# Patient Record
Sex: FEMALE | Race: BLACK OR AFRICAN AMERICAN | NOT HISPANIC OR LATINO
[De-identification: names, ages, dates, MRNs, and addresses within clinical notes are randomized per-mention and may not be internally consistent; named-entity substitution may affect disease eponyms.]

---

## 2020-04-26 ENCOUNTER — MESSAGE (OUTPATIENT)
Age: 50
End: 2020-04-26

## 2020-05-08 LAB
SARS-COV-2 IGG SERPL IA-ACNC: 0 INDEX
SARS-COV-2 IGG SERPL QL IA: NEGATIVE

## 2021-08-24 ENCOUNTER — OFFICE VISIT (OUTPATIENT)
Dept: DENTISTRY | Facility: CLINIC | Age: 51
End: 2021-08-24

## 2021-08-24 VITALS — DIASTOLIC BLOOD PRESSURE: 96 MMHG | SYSTOLIC BLOOD PRESSURE: 166 MMHG

## 2021-08-24 DIAGNOSIS — K04.7 DENTAL ABSCESS: Primary | ICD-10-CM

## 2021-08-24 PROCEDURE — D7140 EXTRACTION, ERUPTED TOOTH OR EXPOSED ROOT (ELEVATION AND/OR FORCEPS REMOVAL): HCPCS | Performed by: DENTIST

## 2021-08-24 RX ORDER — IBUPROFEN 400 MG/1
400 TABLET ORAL EVERY 6 HOURS PRN
Qty: 20 TABLET | Refills: 0 | Status: SHIPPED | OUTPATIENT
Start: 2021-08-24

## 2021-08-24 NOTE — PROGRESS NOTES
Oral Surgery    Javon Canada presents for Ext #31  Pt's BP high at this visit (166/96) but pt in severe pain  Dr Marcelino Weber still recommended extracting tooth to get the pt out of pain  [address], patient denies any changes  Obtained a direct and personal consent  Risks and complications were explained  Pt agreed and consented  Consent scanned in doc center  Administered 2 cc of 2 % Lidocaine w/ 1:100,000 epi via block/infiltration  ? Adequate anesthesia obtained, reflected gingiva, elevated, and extracted #31  Socket irrigated, and gauze with pressure applied      Upon dismissal, patient received POI, gauze, and RX: ibuprofen 400mg    Dr Singh Weber      NV: comp exam

## 2023-10-20 ENCOUNTER — APPOINTMENT (EMERGENCY)
Dept: RADIOLOGY | Facility: HOSPITAL | Age: 53
End: 2023-10-20

## 2023-10-20 ENCOUNTER — HOSPITAL ENCOUNTER (EMERGENCY)
Facility: HOSPITAL | Age: 53
Discharge: HOME | End: 2023-10-20
Attending: EMERGENCY MEDICINE

## 2023-10-20 VITALS
TEMPERATURE: 98.3 F | SYSTOLIC BLOOD PRESSURE: 176 MMHG | RESPIRATION RATE: 22 BRPM | HEART RATE: 88 BPM | DIASTOLIC BLOOD PRESSURE: 98 MMHG | OXYGEN SATURATION: 97 %

## 2023-10-20 DIAGNOSIS — R06.00 DYSPNEA: ICD-10-CM

## 2023-10-20 DIAGNOSIS — R09.81 CONGESTION OF NASAL SINUS: Primary | ICD-10-CM

## 2023-10-20 LAB
ANION GAP SERPL CALCULATED.3IONS-SCNC: 7 MMOL/L
BASOPHILS # BLD AUTO: 0.06 THOUSANDS/ÂΜL (ref 0–0.1)
BASOPHILS NFR BLD AUTO: 1 % (ref 0–1)
BUN SERPL-MCNC: 19 MG/DL (ref 5–25)
CALCIUM SERPL-MCNC: 8.9 MG/DL (ref 8.4–10.2)
CARDIAC TROPONIN I PNL SERPL HS: 4 NG/L
CHLORIDE SERPL-SCNC: 104 MMOL/L (ref 96–108)
CO2 SERPL-SCNC: 30 MMOL/L (ref 21–32)
CREAT SERPL-MCNC: 1.16 MG/DL (ref 0.6–1.3)
EOSINOPHIL # BLD AUTO: 0.02 THOUSAND/ÂΜL (ref 0–0.61)
EOSINOPHIL NFR BLD AUTO: 0 % (ref 0–6)
ERYTHROCYTE [DISTWIDTH] IN BLOOD BY AUTOMATED COUNT: 14.5 % (ref 11.6–15.1)
GFR SERPL CREATININE-BSD FRML MDRD: 54 ML/MIN/1.73SQ M
GLUCOSE SERPL-MCNC: 132 MG/DL (ref 65–140)
HCT VFR BLD AUTO: 41.5 % (ref 34.8–46.1)
HGB BLD-MCNC: 13.4 G/DL (ref 11.5–15.4)
IMM GRANULOCYTES # BLD AUTO: 0.06 THOUSAND/UL (ref 0–0.2)
IMM GRANULOCYTES NFR BLD AUTO: 1 % (ref 0–2)
LYMPHOCYTES # BLD AUTO: 1.27 THOUSANDS/ÂΜL (ref 0.6–4.47)
LYMPHOCYTES NFR BLD AUTO: 12 % (ref 14–44)
MCH RBC QN AUTO: 29.2 PG (ref 26.8–34.3)
MCHC RBC AUTO-ENTMCNC: 32.3 G/DL (ref 31.4–37.4)
MCV RBC AUTO: 90 FL (ref 82–98)
MONOCYTES # BLD AUTO: 0.19 THOUSAND/ÂΜL (ref 0.17–1.22)
MONOCYTES NFR BLD AUTO: 2 % (ref 4–12)
NEUTROPHILS # BLD AUTO: 9.31 THOUSANDS/ÂΜL (ref 1.85–7.62)
NEUTS SEG NFR BLD AUTO: 84 % (ref 43–75)
NRBC BLD AUTO-RTO: 0 /100 WBCS
PLATELET # BLD AUTO: 310 THOUSANDS/UL (ref 149–390)
PMV BLD AUTO: 9.9 FL (ref 8.9–12.7)
POTASSIUM SERPL-SCNC: 3.7 MMOL/L (ref 3.5–5.3)
RBC # BLD AUTO: 4.59 MILLION/UL (ref 3.81–5.12)
SODIUM SERPL-SCNC: 141 MMOL/L (ref 135–147)
WBC # BLD AUTO: 10.91 THOUSAND/UL (ref 4.31–10.16)

## 2023-10-20 PROCEDURE — 99285 EMERGENCY DEPT VISIT HI MDM: CPT | Performed by: EMERGENCY MEDICINE

## 2023-10-20 PROCEDURE — 99285 EMERGENCY DEPT VISIT HI MDM: CPT

## 2023-10-20 PROCEDURE — 71046 X-RAY EXAM CHEST 2 VIEWS: CPT

## 2023-10-20 PROCEDURE — 36415 COLL VENOUS BLD VENIPUNCTURE: CPT

## 2023-10-20 PROCEDURE — 80048 BASIC METABOLIC PNL TOTAL CA: CPT

## 2023-10-20 PROCEDURE — 84484 ASSAY OF TROPONIN QUANT: CPT

## 2023-10-20 PROCEDURE — 93005 ELECTROCARDIOGRAM TRACING: CPT

## 2023-10-20 PROCEDURE — 85025 COMPLETE CBC W/AUTO DIFF WBC: CPT

## 2023-10-20 RX ORDER — ALBUTEROL SULFATE 2.5 MG/3ML
1 SOLUTION RESPIRATORY (INHALATION) ONCE
Status: COMPLETED | OUTPATIENT
Start: 2023-10-20 | End: 2023-10-20

## 2023-10-20 RX ORDER — METHYLPREDNISOLONE SOD SUCC 125 MG
1 VIAL (EA) INJECTION ONCE
Status: COMPLETED | OUTPATIENT
Start: 2023-10-20 | End: 2023-10-20

## 2023-10-20 RX ORDER — IPRATROPIUM BROMIDE AND ALBUTEROL SULFATE .5; 3 MG/3ML; MG/3ML
1 SOLUTION RESPIRATORY (INHALATION) ONCE
Status: COMPLETED | OUTPATIENT
Start: 2023-10-20 | End: 2023-10-20

## 2023-10-20 RX ORDER — OXYMETAZOLINE HYDROCHLORIDE 0.05 G/100ML
2 SPRAY NASAL 2 TIMES DAILY
Qty: 30 ML | Refills: 0 | Status: SHIPPED | OUTPATIENT
Start: 2023-10-20

## 2023-10-20 NOTE — ED ATTENDING ATTESTATION
10/20/2023  ISherry MD, saw and evaluated the patient. I have discussed the patient with the resident/non-physician practitioner and agree with the resident's/non-physician practitioner's findings, Plan of Care, and MDM as documented in the resident's/non-physician practitioner's note, except where noted. All available labs and Radiology studies were reviewed. I was present for key portions of any procedure(s) performed by the resident/non-physician practitioner and I was immediately available to provide assistance. At this point I agree with the current assessment done in the Emergency Department. I have conducted an independent evaluation of this patient a history and physical is as follows: This is a 48 y.o. old female who presents to the ED for evaluation of chest pressure. Thought it was asthma, didn't respond to breathing treatment. Got worse with treatment. Has been sick, on abx and steroids for uri. VS and nursing notes reviewed  General: Appears in NAD, awake, alert, speaking normally in full sentences. Well-nourished, well-developed. Appears stated age. Head: Normocephalic, atraumatic. Eyes: EOMI. Vision grossly normal. No subconjunctival hemorrhages or occular discharge noted. Symmetrical lids. ENT: Atraumatic external nose and ears. No stridor. Normal phonation. No drooling. Normal swallowing. Neck: No JVD. FROM. No goiter  CV: No pallor. Normal rate. Lungs: No tachypnea. No respiratory distress. MSK: Moving all extremities equally, no peripheral edema  Skin: Dry, intact. No cyanosis  Neuro: Awake, alert, GCS15. CN II-XII grossly intact. Grossly normal gait. Psychiatric/Behavioral: Appropriate mood and affect. A/P: This is a 48 y.o. female who presents to the ED for evaluation of chest pressue. Lungs clear and given lack of improvement prior with bronchodilators, I do not believe this was asthma. Will rule out ACS.   Also concern for anxiety versus medication reaction. No other signs of anaphylaxis or allergic reaction. Will check labs, EKG, chest x-ray. Toradol. Reevaluate disposition accordingly.     HEART Risk Score      Flowsheet Row Most Recent Value   Heart Score Risk Calculator    History 0 Filed at: 10/20/2023 2201   ECG 1 Filed at: 10/20/2023 2201   Age 1 Filed at: 10/20/2023 2201   Risk Factors 1 Filed at: 10/20/2023 2201   Troponin 0 Filed at: 10/20/2023 2201   HEART Score 3 Filed at: 10/20/2023 2201            ED Course       Critical Care Time  Procedures

## 2023-10-20 NOTE — ED PROVIDER NOTES
History  Chief Complaint   Patient presents with    Shortness of Breath     Pt c/o SOB, recently diagnosed with sinusitis, hx of asthma, recently placed on abx and prednisone, no relief with rescue inhalers      HPI    Prior to Admission Medications   Prescriptions Last Dose Informant Patient Reported? Taking?   ibuprofen (MOTRIN) 400 mg tablet   No No   Sig: Take 1 tablet (400 mg total) by mouth every 6 (six) hours as needed for mild pain      Facility-Administered Medications: None       Past Medical History:   Diagnosis Date    Asthma     Hypertension        History reviewed. No pertinent surgical history. History reviewed. No pertinent family history. I have reviewed and agree with the history as documented.     E-Cigarette/Vaping    E-Cigarette Use Never User      E-Cigarette/Vaping Substances     Social History     Tobacco Use    Smoking status: Never    Smokeless tobacco: Never   Vaping Use    Vaping Use: Never used   Substance Use Topics    Alcohol use: Not Currently    Drug use: Not Currently        Review of Systems    Physical Exam  ED Triage Vitals [10/20/23 1819]   Temperature Pulse Respirations Blood Pressure SpO2   98.3 °F (36.8 °C) 100 22 154/81 94 %      Temp Source Heart Rate Source Patient Position - Orthostatic VS BP Location FiO2 (%)   Oral Monitor Lying Right arm --      Pain Score       --             Orthostatic Vital Signs  Vitals:    10/20/23 1819   BP: 154/81   Pulse: 100   Patient Position - Orthostatic VS: Lying       Physical Exam    ED Medications  Medications   albuterol (FOR EMS ONLY) (2.5 mg/3 mL) 0.083 % inhalation solution 2.5 mg (0 mg Does not apply Given to EMS 10/20/23 1836)   ipratropium-albuterol (FOR EMS ONLY) (DUO-NEB) 0.5-2.5 mg/3 mL inhalation solution 3 mL (0 mL Does not apply Given to EMS 10/20/23 1836)   methylPREDNISolone sodium succinate (FOR EMS ONLY) (Solu-MEDROL) 125 MG injection 125 mg (0 mg Does not apply Given to EMS 10/20/23 1836)       Diagnostic Studies  Results Reviewed       None                   No orders to display         Procedures  Procedures      ED Course                                       Medical Decision Making  Risk  Prescription drug management. Disposition  Final diagnoses:   None     ED Disposition       None          Follow-up Information    None         Patient's Medications   Discharge Prescriptions    No medications on file     No discharge procedures on file. PDMP Review       None             ED Provider  Attending physically available and evaluated Sabina Lerma. I managed the patient along with the ED Attending.     Electronically Signed by (FOR EMS ONLY) (DUO-NEB) 0.5-2.5 mg/3 mL inhalation solution 3 mL (0 mL Does not apply Given to EMS 10/20/23 1836)   methylPREDNISolone sodium succinate (FOR EMS ONLY) (Solu-MEDROL) 125 MG injection 125 mg (0 mg Does not apply Given to EMS 10/20/23 1836)       Diagnostic Studies  Results Reviewed       Procedure Component Value Units Date/Time    HS Troponin 0hr (reflex protocol) [[id number]]  (Normal) Collected: 10/20/23 2025    Lab Status: Final result Specimen: Blood from Hand, Left Updated: 10/20/23 2059     hs TnI 0hr 4 ng/L     Basic metabolic panel [[id number]] Collected: 10/20/23 2025    Lab Status: Final result Specimen: Blood from Hand, Left Updated: 10/20/23 2058     Sodium 141 mmol/L      Potassium 3.7 mmol/L      Chloride 104 mmol/L      CO2 30 mmol/L      ANION GAP 7 mmol/L      BUN 19 mg/dL      Creatinine 1.16 mg/dL      Glucose 132 mg/dL      Calcium 8.9 mg/dL      eGFR 54 ml/min/1.73sq m     Narrative:      [place] guidelines for Chronic Kidney Disease (CKD):     Stage 1 with normal or high GFR (GFR > 90 mL/min/1.73 square meters)    Stage 2 Mild CKD (GFR = 60-89 mL/min/1.73 square meters)    Stage 3A Moderate CKD (GFR = 45-59 mL/min/1.73 square meters)    Stage 3B Moderate CKD (GFR = 30-44 mL/min/1.73 square meters)    Stage 4 Severe CKD (GFR = 15-29 mL/min/1.73 square meters)    Stage 5 End Stage CKD (GFR <15 mL/min/1.73 square meters)  Note: GFR calculation is accurate only with a steady state creatinine    CBC and differential [[id number]]  (Abnormal) Collected: 10/20/23 2025    Lab Status: Final result Specimen: Blood from Hand, Left Updated: 10/20/23 2034     WBC 10.91 Thousand/uL      RBC 4.59 Million/uL      Hemoglobin 13.4 g/dL      Hematocrit 41.5 %      MCV 90 fL      MCH 29.2 pg      MCHC 32.3 g/dL      RDW 14.5 %      MPV 9.9 fL      Platelets 600 Thousands/uL      nRBC 0 /100 WBCs      Neutrophils Relative 84 %      Immat GRANS % 1 %      Lymphocytes Relative 12 %      Monocytes Relative 2 %      Eosinophils Relative 0 %      Basophils Relative 1 %      Neutrophils Absolute 9.31 Thousands/µL      Immature Grans Absolute 0.06 Thousand/uL      Lymphocytes Absolute 1.27 Thousands/µL      Monocytes Absolute 0.19 Thousand/µL      Eosinophils Absolute 0.02 Thousand/µL      Basophils Absolute 0.06 Thousands/µL                    XR chest 2 views   Final Result by Margie Blunt MD (10/21 1017)      No acute cardiopulmonary disease. Workstation performed: [id number]               Procedures  Procedures      ED Course             HEART Risk Score      Flowsheet Row Most Recent Value   Heart Score Risk Calculator    History 0 Filed at: 10/20/2023 2201   ECG 1 Filed at: 10/20/2023 2201   Age 1 Filed at: 10/20/2023 2201   Risk Factors 1 Filed at: 10/20/2023 2201   Troponin 0 Filed at: 10/20/2023 2201   HEART Score 3 Filed at: 10/20/2023 2201                                  Medical Decision Making  Amount and/or Complexity of Data Reviewed  Labs: ordered. Radiology: ordered. Risk  OTC drugs. Prescription drug management. Disposition  Final diagnoses:   Congestion of nasal sinus   Dyspnea     Time reflects when diagnosis was documented in both MDM as applicable and the Disposition within this note       Time User Action Codes Description Comment    10/20/2023  9:38 PM Julio Gregg Add [R09.81] Congestion of nasal sinus     10/20/2023  9:38 PM Julio Gregg Add [R06.00] Dyspnea           ED Disposition       ED Disposition   Discharge    Condition   Stable    Date/Time   Fri Oct 20, 2023 2138    [address] discharge to home/self care.                    Follow-up Information       Follow up With Specialties Details Why Contact Info Additional [address] [facility] Emergency Department Emergency Medicine  If symptoms worsen [address]  [phone] [address] [facility] Emergency Department, [address], [place], [place], [address] In 1 week  Health system [phone]  [facility] [address], [address] [place] [address], [address] [facility]            Discharge Medication List as of 10/20/2023  9:47 PM        START taking these medications    Details   oxymetazoline (AFRIN) 0.05 % nasal spray 2 sprays by Each Nare route 2 (two) times a day, Starting Fri 10/20/2023, Normal           CONTINUE these medications which have NOT CHANGED    Details   ibuprofen (MOTRIN) 400 mg tablet Take 1 tablet (400 mg total) by mouth every 6 (six) hours as needed for mild pain, Starting Tue 8/24/2021, Normal           No discharge procedures on file. PDMP Review       None             ED Provider  Attending physically available and evaluated Marlena Collins. I managed the patient along with the ED Attending.     Electronically Signed by           Jackie Fitzgerald DO  10/23/23 7420

## 2023-10-21 LAB
ATRIAL RATE: 81 BPM
ATRIAL RATE: 85 BPM
ATRIAL RATE: 90 BPM
P AXIS: 78 DEGREES
P AXIS: 84 DEGREES
P AXIS: 88 DEGREES
PR INTERVAL: 176 MS
PR INTERVAL: 176 MS
PR INTERVAL: 184 MS
QRS AXIS: 26 DEGREES
QRS AXIS: 30 DEGREES
QRS AXIS: 31 DEGREES
QRSD INTERVAL: 56 MS
QRSD INTERVAL: 62 MS
QRSD INTERVAL: 64 MS
QT INTERVAL: 328 MS
QT INTERVAL: 356 MS
QT INTERVAL: 362 MS
QTC INTERVAL: 401 MS
QTC INTERVAL: 413 MS
QTC INTERVAL: 430 MS
T WAVE AXIS: 110 DEGREES
T WAVE AXIS: 92 DEGREES
T WAVE AXIS: 97 DEGREES
VENTRICULAR RATE: 81 BPM
VENTRICULAR RATE: 85 BPM
VENTRICULAR RATE: 90 BPM

## 2023-10-21 PROCEDURE — 93010 ELECTROCARDIOGRAM REPORT: CPT | Performed by: INTERNAL MEDICINE

## 2023-10-21 NOTE — DISCHARGE INSTRUCTIONS
Afrin, tylenol, motrin  Follow up with pcp outpatient  If you have worsening symptoms, come back to the ED